# Patient Record
Sex: MALE | URBAN - METROPOLITAN AREA
[De-identification: names, ages, dates, MRNs, and addresses within clinical notes are randomized per-mention and may not be internally consistent; named-entity substitution may affect disease eponyms.]

---

## 2023-03-04 ENCOUNTER — HOSPITAL ENCOUNTER (EMERGENCY)
Facility: CLINIC | Age: 23
Discharge: LEFT BEFORE DISCHARGE | End: 2023-03-04
Admitting: EMERGENCY MEDICINE

## 2023-03-04 VITALS
HEART RATE: 116 BPM | SYSTOLIC BLOOD PRESSURE: 167 MMHG | RESPIRATION RATE: 16 BRPM | HEIGHT: 72 IN | BODY MASS INDEX: 20.32 KG/M2 | DIASTOLIC BLOOD PRESSURE: 79 MMHG | TEMPERATURE: 98.3 F | WEIGHT: 150 LBS | OXYGEN SATURATION: 98 %

## 2023-03-04 PROCEDURE — 999N000104 HC STATISTIC NO CHARGE

## 2023-03-04 PROCEDURE — 99281 EMR DPT VST MAYX REQ PHY/QHP: CPT

## 2023-03-05 NOTE — ED TRIAGE NOTES
Patient states took 3.5 gram of mushrooms today.  Took at 4pm.  States wants to make sure is ok.  Feels weird & feels like needs to be here.  Not very forthcoming.  Initially only stating does not know why here or why needs to be seen.     Triage Assessment     Row Name 03/04/23 8420       Triage Assessment (Adult)    Airway WDL WDL       Respiratory WDL    Respiratory WDL WDL       Skin Circulation/Temperature WDL    Skin Circulation/Temperature WDL WDL       Cardiac WDL    Cardiac WDL WDL       Peripheral/Neurovascular WDL    Peripheral Neurovascular WDL WDL       Cognitive/Neuro/Behavioral WDL    Cognitive/Neuro/Behavioral WDL WDL